# Patient Record
Sex: FEMALE | Race: WHITE | Employment: FULL TIME | URBAN - METROPOLITAN AREA
[De-identification: names, ages, dates, MRNs, and addresses within clinical notes are randomized per-mention and may not be internally consistent; named-entity substitution may affect disease eponyms.]

---

## 2023-02-08 ENCOUNTER — OFFICE VISIT (OUTPATIENT)
Dept: PULMONOLOGY | Age: 63
End: 2023-02-08
Payer: COMMERCIAL

## 2023-02-08 VITALS
TEMPERATURE: 97.2 F | HEIGHT: 66 IN | DIASTOLIC BLOOD PRESSURE: 62 MMHG | WEIGHT: 161 LBS | SYSTOLIC BLOOD PRESSURE: 102 MMHG | HEART RATE: 83 BPM | BODY MASS INDEX: 25.88 KG/M2 | OXYGEN SATURATION: 99 %

## 2023-02-08 DIAGNOSIS — R91.1 LUNG NODULE: Primary | ICD-10-CM

## 2023-02-08 DIAGNOSIS — J47.9 BRONCHIECTASIS WITHOUT COMPLICATION (HCC): ICD-10-CM

## 2023-02-08 DIAGNOSIS — R06.2 WHEEZING: ICD-10-CM

## 2023-02-08 LAB
EXPIRATORY TIME: NORMAL
FEF 25-75% %PRED-PRE: NORMAL
FEF 25-75% PRED: NORMAL
FEF 25-75%-PRE: NORMAL
FEV1 %PRED-PRE: 64 %
FEV1 PRED: NORMAL
FEV1/FVC %PRED-PRE: NORMAL
FEV1/FVC PRED: NORMAL
FEV1/FVC: 71 %
FEV1: 1.75 L
FVC %PRED-PRE: 70 %
FVC PRED: NORMAL
FVC: 2.46 L
PEF %PRED-PRE: NORMAL
PEF PRED: NORMAL
PEF-PRE: NORMAL

## 2023-02-08 PROCEDURE — G8427 DOCREV CUR MEDS BY ELIG CLIN: HCPCS | Performed by: INTERNAL MEDICINE

## 2023-02-08 PROCEDURE — 94010 BREATHING CAPACITY TEST: CPT | Performed by: INTERNAL MEDICINE

## 2023-02-08 PROCEDURE — G8419 CALC BMI OUT NRM PARAM NOF/U: HCPCS | Performed by: INTERNAL MEDICINE

## 2023-02-08 PROCEDURE — 4004F PT TOBACCO SCREEN RCVD TLK: CPT | Performed by: INTERNAL MEDICINE

## 2023-02-08 PROCEDURE — G8484 FLU IMMUNIZE NO ADMIN: HCPCS | Performed by: INTERNAL MEDICINE

## 2023-02-08 PROCEDURE — 3017F COLORECTAL CA SCREEN DOC REV: CPT | Performed by: INTERNAL MEDICINE

## 2023-02-08 PROCEDURE — 99204 OFFICE O/P NEW MOD 45 MIN: CPT | Performed by: INTERNAL MEDICINE

## 2023-02-08 RX ORDER — BETAMETHASONE DIPROPIONATE 0.5 MG/G
1 OINTMENT TOPICAL 2 TIMES DAILY
COMMUNITY
Start: 2022-07-11 | End: 2023-07-11

## 2023-02-08 RX ORDER — CELECOXIB 200 MG/1
200 CAPSULE ORAL 2 TIMES DAILY PRN
COMMUNITY
Start: 2022-07-11

## 2023-02-08 RX ORDER — HYDROXYZINE HYDROCHLORIDE 10 MG/1
10 TABLET, FILM COATED ORAL 3 TIMES DAILY PRN
COMMUNITY
Start: 2022-07-11

## 2023-02-08 RX ORDER — VALACYCLOVIR HYDROCHLORIDE 500 MG/1
500 TABLET, FILM COATED ORAL 2 TIMES DAILY
COMMUNITY
Start: 2022-07-11 | End: 2023-07-11

## 2023-02-08 RX ORDER — ALBUTEROL SULFATE 90 UG/1
2 AEROSOL, METERED RESPIRATORY (INHALATION) EVERY 6 HOURS PRN
COMMUNITY
Start: 2022-10-17

## 2023-02-08 RX ORDER — FLUTICASONE PROPIONATE AND SALMETEROL 113; 14 UG/1; UG/1
1 POWDER, METERED RESPIRATORY (INHALATION) DAILY
Qty: 1 EACH | Refills: 11 | Status: SHIPPED | OUTPATIENT
Start: 2023-02-08

## 2023-02-08 RX ORDER — ALBUTEROL SULFATE 90 UG/1
2 AEROSOL, METERED RESPIRATORY (INHALATION) EVERY 6 HOURS PRN
Qty: 1 EACH | Refills: 11 | Status: SHIPPED | OUTPATIENT
Start: 2023-02-08

## 2023-02-08 RX ORDER — LORATADINE 10 MG/1
10 TABLET ORAL DAILY
COMMUNITY
Start: 2022-07-11 | End: 2023-07-11

## 2023-02-08 RX ORDER — LEVOTHYROXINE SODIUM 112 UG/1
TABLET ORAL
COMMUNITY
Start: 2023-01-20

## 2023-02-08 ASSESSMENT — PULMONARY FUNCTION TESTS
FEV1_PERCENT_PREDICTED_PRE: 64
FEV1/FVC: 71
FVC: 2.46
FVC_PERCENT_PREDICTED_PRE: 70
FEV1: 1.75

## 2023-02-08 NOTE — PROGRESS NOTES
Name:  Henry Lobo  YOB: 1960   MRN: [id number]      Office Visit: 2/8/2023        ASSESSMENT AND PLAN:  (Medical Decision Making)    Impression: 27-year-old female with acute episode of prolonged cough with hemoptysis now resolved, but during that episode found focal right middle lobe bronchiectasis, mucous plugging and question of mass that improved on subsequent imaging following antibiotics. 1. Lung nodule  This seems to be related to bronchiectasis, mucous plugging and potentially distal infection. This seems to be reasonably treated and has improved significantly on follow-up imaging. There was a negative PET scan and likelihood of malignancy is considered low. We will try to increase her airway clearance and repeat a CT scan in 3 months. Will need to space out any further imaging after that. - CT CHEST WO CONTRAST; Future    2. Bronchiectasis without complication ([address]  Focal bronchiectasis only. She does have some question of asthma, but [facility]. While she had some Aspergillus IgE's that were mildly positive this does not appear to represent ABPA with only focal bronchiectasis in quite low IgE. I would recommend for now checking sputum cultures, using albuterol and flutter valve at least daily and repeating her imaging as above. - Spirometry Without Bronchodilator  - albuterol sulfate HFA (PROVENTIL;VENTOLIN;PROAIR) 108 (90 Base) MCG/ACT inhaler; Inhale 2 puffs into the lungs every 6 hours as needed for Wheezing  Dispense: 1 each; Refill: 57 - [facility] - [address]; Future  - Culture, Respiratory; Future  - AFB Culture + Smear W/Rflx ID From Culture; Future  - Culture, Fungus; Future    3. Wheezing  She had some mild wheezing on exam and some history of significant allergies as well as some what appears to be obstruction on breathing test though technically was a restrictive pattern.   Recommend a trial of air duo daily plus albuterol as needed/daily for bronchiectasis and follow-up in 3 months with spirometry  - Fluticasone-Salmeterol,sensor, (AIRDUO DIGIHALER) 113-14 MCG/ACT AEPB; Inhale 1 puff into the lungs daily  Dispense: 1 each; Refill: 11  - albuterol sulfate HFA (PROVENTIL;VENTOLIN;PROAIR) 108 (90 Base) MCG/ACT inhaler; Inhale 2 puffs into the lungs every 6 hours as needed for Wheezing  Dispense: 1 each; Refill: 11  Orders Placed This Encounter   Medications    Fluticasone-Salmeterol,sensor, (AIRDUO DIGIHALER) 113-14 MCG/ACT AEPB     Sig: Inhale 1 puff into the lungs daily     Dispense:  1 each     Refill:  11    albuterol sulfate HFA (PROVENTIL;VENTOLIN;PROAIR) 108 (90 Base) MCG/ACT inhaler     Sig: Inhale 2 puffs into the lungs every 6 hours as needed for Wheezing     Dispense:  1 each     Refill:  11         Procedures    DME - DURABLE MEDICAL EQUIPMENT     Please provide pt with flutter device. 10 exhalations 3 times a day. Follow-up and Dispositions    Return in about 3 months (around 5/8/2023) for With Dr. Yang Hartmann at follow up. Tyra Vazquez MD    No specialty comments available. Total time for encounter on day of encounter was 60 minutes. This time includes chart prep, review of tests/procedures, review of other provider's notes, documentation and counseling patient regarding disease process and medications. _________________________________________________________________________    HISTORY OF PRESENT ILLNESS:    Ms. Ingrid Davis is a 65 y.o. female who is seen at [address] today for  New Patient  36-year-old female with history of longstanding allergies, hayfever as a kid who lives in [place] with household farm pets of chickens, horses. She had an episode in September when she was helping her  move and cleaning up his house and was moving mulch and thought that she was just run down, but was very fatigued and on working.   She acutely developed a much worse cough that actually developed hemoptysis and dark mucous plugs. She was given Augmentin for 7 days and had an abnormal infiltrate on the right with chest x-ray. She had subsequent CT scan and PET scan due to what appeared to be potentially a right lung mass. PET scan did not demonstrate PET activity and a follow-up CT scan in January showed improvement. She did see a pulmonologist at [facility] prior to her last CT that showed improvement and was offered a bronchoscopy, but she declined as she has had prior concerns for complication with the hysterectomy had and had. She has no more hemoptysis, but does have some mild coughing at times. She denies any fevers, chills, night sweats or weight loss. She still has some sputum at times though its less discolored. She did do some sputum cultures sent and had that had no growth and an IgE that was 28 as well as a RAST panel that had some low-level positivity on multiple allergens. REVIEW OF SYSTEMS: 10 point review of systems is negative except as reported in HPI. PHYSICAL EXAM: Body mass index is 25.99 kg/m². Vitals:    02/08/23 0806   BP: 102/62   Pulse: 83   Temp: 97.2 °F (36.2 °C)   TempSrc: Skin   SpO2: 99%   Weight: 161 lb (73 kg)   Height: 5' 6\" (1.676 m)         General:   Alert, cooperative, no distress, appears stated age. Eyes:   Conjunctivae/corneas clear. PERRL        Mouth/Throat:  Lips, mucosa, and tongue normal. Teeth and gums normal.        Lungs:   Mild opening wheeze, good air movement otherwise     Heart:   Regular rate and rhythm, S1, S2 normal, no murmur, click, rub or gallop. Abdomen:    Soft, non-tender. Extremities:  Extremities normal, atraumatic, no cyanosis or edema.      Skin:  Skin color normal. No rashes or lesions     Neurologic:  A&Ox3     DIAGNOSTIC TESTS:                                                                                    LABS: No results found for: WBC, HGB, HCT, PLT, TSH, IGE, NTPROBNP, GABRIELLA, ANCA, RF, ESR, CRP  Imaging: I performed an independent interpretation of the patient's images. CXR: Right middle lobe infiltrate    No results found for this or any previous visit from the past 3650 days. CT Chest: Right middle lobe bronchiectasis, mucous plugging and distal infiltrate/mass which is improved from October to January        No results found for this or any previous visit from the past 3650 days. Nuclear Medicine:     No results found for this or any previous visit from the past 3650 days. PFTs: moderate restrictive pattern, scooping on loops  Office Spirometry Results Latest Ref Rng & Units 2/8/2023   FVC L 2.46   FEV1 L 1.75   FEV1 %PRED-PRE % 64   FVC %PRED-PRE % 70   FEV1/FVC % 71     No results found for this or any previous visit. No results found for this or any previous visit. FeNO: No results found for this or any previous visit. FeNO and Likelihood of Eosinophilic Asthma   Unlikely Intermediate Likely   <25 ppb 25-50 ppb >50ppb   Exercise Oximetry:  Echo: No results found for this or any previous visit from the past 3650 days. [facility] Reference Info:                                                                                                                  No past medical history on file.      Tobacco Use      Smoking status: Every Day        Packs/day: 0.50        Types: Cigarettes        Start date: 2006      Smokeless tobacco: Not on file    No Known Allergies  Current Outpatient Medications   Medication Instructions    albuterol sulfate HFA (PROVENTIL;VENTOLIN;PROAIR) 108 (90 Base) MCG/ACT inhaler 2 puffs, Inhalation, EVERY 6 HOURS PRN    albuterol sulfate HFA (PROVENTIL;VENTOLIN;PROAIR) 108 (90 Base) MCG/ACT inhaler 2 puffs, Inhalation, EVERY 6 HOURS PRN    augmented betamethasone dipropionate (DIPROLENE-AF) 0.05 % ointment 1 application, Topical, 2 TIMES DAILY    celecoxib (CELEBREX) 200 mg, Oral, 2 TIMES DAILY PRN    Fluticasone-Salmeterol,sensor, (AIRDUO DIGIHALER) 113-14 MCG/ACT AEPB 1 puff, Inhalation, DAILY    hydrOXYzine HCl (ATARAX) 10 mg, Oral, 3 TIMES DAILY PRN    levothyroxine (SYNTHROID) 112 MCG tablet TAKE 1 TABLET BY MOUTH BEFORE BREAKFAST    loratadine (CLARITIN) 10 mg, Oral, DAILY    valACYclovir (VALTREX) 500 mg, Oral, 2 TIMES DAILY

## 2023-02-08 NOTE — PATIENT INSTRUCTIONS
How to collect a sputum sample  Your doctor or nurse will give you a special plastic cup for collecting your sputum. Follow these steps carefully:    --The cup is very clean. Don't open it until you are ready to use it. --As soon as you wake up in the morning (before you eat or drink anything), brush your teeth and rinse your mouth with water. Do not use mouthwash. --If possible, go outside or open a window before collecting the sputum sample. This helps protect other people from germs when you cough. --Take a very deep breath and hold the air for 5 seconds. Slowly breathe out. Take another deep breath and cough hard until some sputum comes up into your mouth. Spit the sputum into the plastic cup.  --Keep doing this until the sputum reaches the 5 ml line (or more) on the plastic cup. This is about 1 teaspoon of sputum. --Screw the cap on the cup tightly so it doesn't leak. --Write on the cup the date you collected the sputum. --Put the cup into the box or bag the nurse gave you. --Give the cup to the [facility]. Hillary Cherry. You can store the cup in the refrigerator overnight if necessary. Do not put it in the freezer or leave it at room temperature. Why is a sputum test necessary? Your doctor wants to collect some of the sputum (\"phlegm\") that you cough up from your lungs. The laboratory will test the sputum for tuberculosis (TB) germs. Checking your sputum is the best way to find out if you have TB disease. If you are already taking medicine for TB, checking your sputum is the best way to tell if the medicine is working. To be sure the test is accurate, you must cough up sputum from deep inside your lungs. Sputum from your lungs is usually thick and sticky. Saliva comes from your mouth and is watery and thin. Do not collect saliva. Tip: If you cannot cough up sputum, try breathing steam from a hot shower or a pan of boiling water.

## 2023-02-15 RX ORDER — FLUTICASONE PROPIONATE AND SALMETEROL 113; 14 UG/1; UG/1
1 POWDER, METERED RESPIRATORY (INHALATION) DAILY
Qty: 1 EACH | Refills: 11 | Status: SHIPPED | OUTPATIENT
Start: 2023-02-15

## 2023-02-15 NOTE — TELEPHONE ENCOUNTER
Patient insurance will not pay for:  Zuhair Valentino.   Will pay for Air Duo [place]

## 2023-02-15 NOTE — TELEPHONE ENCOUNTER
Dr. Rebecca Katz, I fixed the prescription for the AirDuo.   Please sign off if appropriate. // Francis Maldonado

## 2023-03-24 DIAGNOSIS — J47.9 BRONCHIECTASIS WITHOUT COMPLICATION (HCC): ICD-10-CM

## 2023-03-25 LAB
BACTERIA SPEC CULT: NORMAL
GRAM STN SPEC: NORMAL
SERVICE CMNT-IMP: NORMAL

## 2023-03-26 LAB
ACID FAST STN SPEC: NEGATIVE
SPECIMEN PREPARATION: NORMAL
SPECIMEN SOURCE: NORMAL

## 2023-03-29 DIAGNOSIS — J47.9 BRONCHIECTASIS WITHOUT COMPLICATION (HCC): Primary | ICD-10-CM

## 2023-04-24 LAB
FUNGAL CULT/SMEAR: NORMAL
FUNGUS (MYCOLOGY) CULTURE: NORMAL
FUNGUS SMEAR: NORMAL
REFLEX TO ID: NORMAL
SPECIMEN PROCESSING: NORMAL
SPECIMEN SOURCE: NORMAL
SPECIMEN SOURCE: NORMAL

## 2023-06-02 LAB
ACID FAST STN SPEC: NEGATIVE
MYCOBACTERIUM SPEC QL CULT: NEGATIVE
SPECIMEN PREPARATION: NORMAL
SPECIMEN SOURCE: NORMAL

## 2023-08-16 ENCOUNTER — TELEPHONE (OUTPATIENT)
Dept: PULMONOLOGY | Age: 63
End: 2023-08-16

## 2023-08-16 NOTE — TELEPHONE ENCOUNTER
Patrizia with [facility] says that there needs to be a PA for the Ct scan w/o contrast .. the patient has appt 8/21/23     [phone]

## 2023-08-18 NOTE — TELEPHONE ENCOUNTER
[facility] is calling back about the patients PA for her CT scan     Trixie  [phone]

## 2023-08-18 NOTE — TELEPHONE ENCOUNTER
CT Chest has been approved and valid from 8/18/23-10/2/23 Auth #[id number] per Cr Morelos @ [facility]. Case #[id number].

## 2023-08-25 ENCOUNTER — OFFICE VISIT (OUTPATIENT)
Dept: PULMONOLOGY | Age: 63
End: 2023-08-25
Payer: COMMERCIAL

## 2023-08-25 VITALS
SYSTOLIC BLOOD PRESSURE: 122 MMHG | DIASTOLIC BLOOD PRESSURE: 70 MMHG | HEIGHT: 66 IN | HEART RATE: 88 BPM | OXYGEN SATURATION: 97 % | BODY MASS INDEX: 26.74 KG/M2 | TEMPERATURE: 97.2 F | RESPIRATION RATE: 18 BRPM | WEIGHT: 166.4 LBS

## 2023-08-25 DIAGNOSIS — R06.2 WHEEZING: ICD-10-CM

## 2023-08-25 DIAGNOSIS — J47.9 BRONCHIECTASIS WITHOUT COMPLICATION (HCC): ICD-10-CM

## 2023-08-25 DIAGNOSIS — R91.1 LUNG NODULE: Primary | ICD-10-CM

## 2023-08-25 LAB
EXPIRATORY TIME: NORMAL
FEF 25-75% %PRED-PRE: NORMAL
FEF 25-75% PRED: NORMAL
FEF 25-75%-PRE: NORMAL
FEV1 %PRED-PRE: 82 %
FEV1 PRED: NORMAL
FEV1/FVC %PRED-PRE: NORMAL
FEV1/FVC PRED: NORMAL
FEV1/FVC: 74 %
FEV1: 2.21 L
FVC %PRED-PRE: 86 %
FVC PRED: NORMAL
FVC: 3 L
PEF %PRED-PRE: NORMAL
PEF PRED: NORMAL
PEF-PRE: NORMAL

## 2023-08-25 PROCEDURE — G8419 CALC BMI OUT NRM PARAM NOF/U: HCPCS | Performed by: INTERNAL MEDICINE

## 2023-08-25 PROCEDURE — 99214 OFFICE O/P EST MOD 30 MIN: CPT | Performed by: INTERNAL MEDICINE

## 2023-08-25 PROCEDURE — 94010 BREATHING CAPACITY TEST: CPT | Performed by: INTERNAL MEDICINE

## 2023-08-25 PROCEDURE — 3017F COLORECTAL CA SCREEN DOC REV: CPT | Performed by: INTERNAL MEDICINE

## 2023-08-25 PROCEDURE — 4004F PT TOBACCO SCREEN RCVD TLK: CPT | Performed by: INTERNAL MEDICINE

## 2023-08-25 PROCEDURE — G8427 DOCREV CUR MEDS BY ELIG CLIN: HCPCS | Performed by: INTERNAL MEDICINE

## 2023-08-25 RX ORDER — FLUTICASONE PROPIONATE 110 UG/1
2 AEROSOL, METERED RESPIRATORY (INHALATION) DAILY
Qty: 1 EACH | Refills: 11 | Status: SHIPPED | OUTPATIENT
Start: 2023-08-25 | End: 2024-08-24

## 2023-08-25 ASSESSMENT — PULMONARY FUNCTION TESTS
FEV1: 2.21
FEV1_PERCENT_PREDICTED_PRE: 82
FEV1/FVC: 74
FVC_PERCENT_PREDICTED_PRE: 86
FVC: 3.00

## 2023-09-05 ENCOUNTER — PATIENT MESSAGE (OUTPATIENT)
Dept: PULMONOLOGY | Age: 63
End: 2023-09-05

## 2023-09-06 NOTE — TELEPHONE ENCOUNTER
From: Eliz Sawyer  To: Dr. Kandace Pace: 9/5/2023 2:54 PM EDT  Subject: Need different inhaler to pharmacy    Hi, This is Major Grave 1960. Couldn't get Flovent filled at [facility] due to insurance refusal. [facility] said they have sent message but as of yet nothing. They're very slow there, claimed that initial request was deleted. I don't have darcie that this is true and am now going into the second week since trying to fill the Flovent. I know Dr. Radha Flores had already put a note in chart re alternative, however [facility] needs actual script sent over. [facility] in [facility] fax Thank you!

## 2023-09-06 NOTE — TELEPHONE ENCOUNTER
Dr. Lina Sosa, I reviewed your previous chart and saw that you mentioned that if the Flovent is not covered then we can try going forward with the Asmanex. I pended the prescription to the patient's preferred pharmacy. Please sign off if this is appropriate.   Thank you so much! // Tracy Boykin

## 2024-02-23 ENCOUNTER — OFFICE VISIT (OUTPATIENT)
Dept: PULMONOLOGY | Age: 64
End: 2024-02-23
Payer: COMMERCIAL

## 2024-02-23 VITALS
SYSTOLIC BLOOD PRESSURE: 105 MMHG | RESPIRATION RATE: 20 BRPM | TEMPERATURE: 98 F | WEIGHT: 165 LBS | DIASTOLIC BLOOD PRESSURE: 60 MMHG | HEART RATE: 84 BPM | BODY MASS INDEX: 26.52 KG/M2 | HEIGHT: 66 IN | OXYGEN SATURATION: 98 %

## 2024-02-23 DIAGNOSIS — J47.9 BRONCHIECTASIS WITHOUT COMPLICATION (HCC): ICD-10-CM

## 2024-02-23 DIAGNOSIS — J45.30 MILD PERSISTENT ASTHMA WITHOUT COMPLICATION: Primary | ICD-10-CM

## 2024-02-23 PROCEDURE — G8419 CALC BMI OUT NRM PARAM NOF/U: HCPCS | Performed by: INTERNAL MEDICINE

## 2024-02-23 PROCEDURE — 99214 OFFICE O/P EST MOD 30 MIN: CPT | Performed by: INTERNAL MEDICINE

## 2024-02-23 PROCEDURE — G8427 DOCREV CUR MEDS BY ELIG CLIN: HCPCS | Performed by: INTERNAL MEDICINE

## 2024-02-23 PROCEDURE — G8484 FLU IMMUNIZE NO ADMIN: HCPCS | Performed by: INTERNAL MEDICINE

## 2024-02-23 PROCEDURE — 3017F COLORECTAL CA SCREEN DOC REV: CPT | Performed by: INTERNAL MEDICINE

## 2024-02-23 PROCEDURE — 4004F PT TOBACCO SCREEN RCVD TLK: CPT | Performed by: INTERNAL MEDICINE

## 2024-02-23 RX ORDER — FLUTICASONE PROPIONATE 110 UG/1
2 AEROSOL, METERED RESPIRATORY (INHALATION) DAILY
Qty: 1 EACH | Refills: 11 | Status: SHIPPED | OUTPATIENT
Start: 2024-02-23 | End: 2025-02-22

## 2024-02-23 RX ORDER — ALBUTEROL SULFATE 90 UG/1
2 AEROSOL, METERED RESPIRATORY (INHALATION) EVERY 6 HOURS PRN
Qty: 1 EACH | Refills: 11 | Status: SHIPPED | OUTPATIENT
Start: 2024-02-23

## 2024-02-23 NOTE — PROGRESS NOTES
negative except as reported in HPI.    PHYSICAL EXAM: Body mass index is 26.63 kg/m².  Vitals:    02/23/24 0917   BP: 105/60   Pulse: 84   Resp: 20   Temp: 98 °F (36.7 °C)   TempSrc: Temporal   SpO2: 98%   Weight: 74.8 kg (165 lb)   Height: 1.676 m (5' 6\")         General:   Alert, cooperative, no distress, appears stated age.        Eyes:   Conjunctivae/corneas clear. PERRL        Mouth/Throat:  Lips, mucosa, and tongue normal. Teeth and gums normal.        Lungs:   Clear bilaterally     Heart:   Regular rate and rhythm, S1, S2 normal, no murmur, click, rub or gallop.     Abdomen:    Soft, non-tender.     Extremities:  Extremities normal, atraumatic, no cyanosis or edema.     Skin:  Skin color normal. No rashes or lesions     Neurologic:  A&Ox3     DIAGNOSTIC TESTS:                                                                                    LABS: No results found for: \"WBC\", \"HGB\", \"HCT\", \"PLT\", \"TSH\", \"IGE\", \"NTPROBNP\", \"GABRIELLA\", \"ANCA\", \"RF\", \"ESR\", \"CRP\"  Imaging: I performed an independent interpretation of the patient's images.  CXR: Right middle lobe infiltrate    XR CHEST STANDARD TWO VW 09/29/2022    Narrative  ACCESSION:[id number]    CLINICAL HISTORY: Hemoptysis    COMPARISON: 1/7/2020    FINDINGS: There is airspace density in the right lung base. The left lung is  clear. The heart and mediastinum are normal. Bones and soft tissues are normal    IMPRESSION: Right lower lobe infiltrate    Dictated by: Natty Velez on 09/29/2022  4:05 PM  Transcribed by: Natty Velez on 09/29/2022  4:05 PM  Electronically verified by: Natty Velez on 09/29/2022  4:05 PM    CT Chest: 8/2023: Right middle lobe bronchiectasis, mucous plugging and distal infiltrate/mass which is improved from October to January          CT CHEST WO CONTRAST     Nuclear Medicine:     No results found for this or any previous visit from the past 3650 days.    PFTs: normal spirometry      Latest Ref Rng & Units 8/25/2023    12:59 PM 2/8/2023

## 2024-08-09 ENCOUNTER — OFFICE VISIT (OUTPATIENT)
Dept: PULMONOLOGY | Age: 64
End: 2024-08-09

## 2024-08-09 VITALS
HEART RATE: 88 BPM | DIASTOLIC BLOOD PRESSURE: 80 MMHG | TEMPERATURE: 98 F | WEIGHT: 167 LBS | RESPIRATION RATE: 20 BRPM | OXYGEN SATURATION: 98 % | SYSTOLIC BLOOD PRESSURE: 120 MMHG | HEIGHT: 67 IN | BODY MASS INDEX: 26.21 KG/M2

## 2024-08-09 DIAGNOSIS — J47.9 BRONCHIECTASIS WITHOUT COMPLICATION (HCC): ICD-10-CM

## 2024-08-09 DIAGNOSIS — J45.30 MILD PERSISTENT ASTHMA WITHOUT COMPLICATION: Primary | ICD-10-CM

## 2024-08-09 LAB
EXPIRATORY TIME: NORMAL
FEF 25-75% %PRED-PRE: NORMAL
FEF 25-75% PRED: NORMAL
FEF 25-75-PRE: NORMAL
FEV1 %PRED-PRE: 63 %
FEV1 PRED: NORMAL
FEV1/FVC %PRED-PRE: NORMAL
FEV1/FVC PRED: NORMAL
FEV1/FVC: 74 %
FEV1: 1.64 L
FVC %PRED-PRE: 66 %
FVC PRED: NORMAL
FVC: 2.22 L
PEF %PRED-PRE: NORMAL
PEF PRED: NORMAL
PEF-PRE: NORMAL

## 2024-08-09 RX ORDER — ALBUTEROL SULFATE 90 UG/1
2 AEROSOL, METERED RESPIRATORY (INHALATION) EVERY 6 HOURS PRN
Qty: 1 EACH | Refills: 11 | Status: SHIPPED | OUTPATIENT
Start: 2024-08-09

## 2024-08-09 RX ORDER — FLUTICASONE PROPIONATE 110 UG/1
2 AEROSOL, METERED RESPIRATORY (INHALATION) DAILY
Qty: 1 EACH | Refills: 11 | Status: CANCELLED | OUTPATIENT
Start: 2024-08-09 | End: 2025-08-09

## 2024-08-09 RX ORDER — FLUTICASONE PROPIONATE 220 UG/1
2 AEROSOL, METERED RESPIRATORY (INHALATION) DAILY
Qty: 1 EACH | Refills: 11 | Status: SHIPPED | OUTPATIENT
Start: 2024-08-09

## 2024-08-09 ASSESSMENT — PULMONARY FUNCTION TESTS
FVC: 2.22
FEV1: 1.64
FEV1_PERCENT_PREDICTED_PRE: 63
FVC_PERCENT_PREDICTED_PRE: 66

## 2024-08-09 NOTE — PROGRESS NOTES
airspace density in the right lung base. The left lung is  clear. The heart and mediastinum are normal. Bones and soft tissues are normal    IMPRESSION: Right lower lobe infiltrate    Dictated by: Natty Velez on 09/29/2022  4:05 PM  Transcribed by: Natty Velez on 09/29/2022  4:05 PM  Electronically verified by: Natty Velez on 09/29/2022  4:05 PM    CT Chest: 5/2024 CT abdomen [facility]      8/2023: Right middle lobe bronchiectasis, mucous plugging and distal infiltrate/mass which is improved from October to January          CT CHEST WO CONTRAST     Nuclear Medicine:     No results found for this or any previous visit from the past 3650 days.    PFTs: normal spirometry      Latest Ref Rng & Units 8/9/2024    12:00 AM 8/25/2023    12:59 PM 2/8/2023    12:00 AM   Office Spirometry Results   FVC L 2.22  3.00  2.46    FEV1 L 1.64  2.21  1.75    FEV1 %Pred-Pre % 63  82  64    FVC %Pred-Pre % 66  86  70    FEV1/FVC % 74  74  71      No results found for this or any previous visit. No results found for this or any previous visit.  FeNO: No results found for this or any previous visit.  FeNO and Likelihood of Eosinophilic Asthma   Unlikely Intermediate Likely   <25 ppb 25-50 ppb >50ppb   Exercise Oximetry:  Echo: No results found for this or any previous visit from the past 3650 days.    [facility] Reference Info:                                                                                                                Past Medical History:   Diagnosis Date    Arthritis 2015    Asthma 1978    seasonal allergy related    Rash 1975    psoriasis/eczema as child        Tobacco Use      Smoking status: Light Smoker        Packs/day: 0.50        Years: 0.5 packs/day for 18.6 years (9.3 ttl pk-yrs)        Types: Cigarettes        Start date: 2006      Smokeless tobacco: Not on file    No Known Allergies  Current Outpatient Medications   Medication Instructions    albuterol sulfate HFA (PROVENTIL;VENTOLIN;PROAIR) 108 (90 Base)

## 2025-03-05 ENCOUNTER — OFFICE VISIT (OUTPATIENT)
Dept: PULMONOLOGY | Age: 65
End: 2025-03-05
Payer: COMMERCIAL

## 2025-03-05 VITALS
DIASTOLIC BLOOD PRESSURE: 80 MMHG | HEART RATE: 107 BPM | SYSTOLIC BLOOD PRESSURE: 112 MMHG | HEIGHT: 67 IN | TEMPERATURE: 97.9 F | BODY MASS INDEX: 26.21 KG/M2 | WEIGHT: 167 LBS | OXYGEN SATURATION: 97 % | RESPIRATION RATE: 19 BRPM

## 2025-03-05 DIAGNOSIS — J45.30 MILD PERSISTENT ASTHMA WITHOUT COMPLICATION: Primary | ICD-10-CM

## 2025-03-05 DIAGNOSIS — J47.9 BRONCHIECTASIS WITHOUT COMPLICATION (HCC): ICD-10-CM

## 2025-03-05 PROCEDURE — G8427 DOCREV CUR MEDS BY ELIG CLIN: HCPCS | Performed by: INTERNAL MEDICINE

## 2025-03-05 PROCEDURE — G2211 COMPLEX E/M VISIT ADD ON: HCPCS | Performed by: INTERNAL MEDICINE

## 2025-03-05 PROCEDURE — 3017F COLORECTAL CA SCREEN DOC REV: CPT | Performed by: INTERNAL MEDICINE

## 2025-03-05 PROCEDURE — G8419 CALC BMI OUT NRM PARAM NOF/U: HCPCS | Performed by: INTERNAL MEDICINE

## 2025-03-05 PROCEDURE — 4004F PT TOBACCO SCREEN RCVD TLK: CPT | Performed by: INTERNAL MEDICINE

## 2025-03-05 PROCEDURE — 99214 OFFICE O/P EST MOD 30 MIN: CPT | Performed by: INTERNAL MEDICINE

## 2025-03-05 RX ORDER — FLUTICASONE PROPIONATE 220 UG/1
2 AEROSOL, METERED RESPIRATORY (INHALATION) DAILY
Qty: 3 EACH | Refills: 3 | Status: SHIPPED | OUTPATIENT
Start: 2025-03-05

## 2025-03-05 RX ORDER — ESTRADIOL 1 MG/1
2 TABLET ORAL DAILY
COMMUNITY
Start: 2024-08-12 | End: 2025-08-12

## 2025-03-05 NOTE — PROGRESS NOTES
Present Illness  The patient is a 64-year-old female with a history of focal right middle lobe bronchiectasis and mild persistent asthma.    She reports satisfactory respiratory function with no nocturnal symptoms but experiences exertional dyspnea, particularly during pollen season. 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 Additionally, she reports episodes of coughing and mild dyspnea later in the day without sputum production. She finds some relief with hydration and warm herbal tea in the afternoon. The patient suffers from seasonal allergic rhinitis, which may exacerbate her respiratory symptoms. She is currently on Fluticasone propionate (Flovent) 220 mcg, 2 puffs daily, which she finds beneficial. She discontinued Budesonide (Pulmicort) due to throat irritation and cough.    Her focal right middle lobe bronchiectasis with mucous plugging has shown improvement with airway clearance techniques.    Supplemental Information  The patient is recovering from a recent trip to [place] in January 2025, where she experienced difficulty at an altitude of 10,000 feet. She developed a rash from Azithromycin (Z-ANDRES), described as similar to a severe sunburn with subsequent peeling skin, and has decided not to take it again.    ALLERGIES  Allergic reaction to Z-ANDRES, resulting in a rash similar to a bad sunburn with peeling skin.    MEDICATIONS  Current: Flovent 220 mcg, 2 puffs daily  Discontinued: Pulmicort    ACT Score:      2/26/2025    12:41 PM   ASTHMA CONTROL TEST   In the past 4 weeks, how much of the time did your asthma keep you from getting as much done at work, school or at home? 4   During the past 4 weeks, how often have you had shortness of breath? 4   During the past 4 weeks, how often did your asthma symptoms (wheezing, coughing, shortness of breath, chest tightness or pain) wake you up at night or earlier than usual